# Patient Record
Sex: MALE | Race: BLACK OR AFRICAN AMERICAN | NOT HISPANIC OR LATINO | Employment: FULL TIME | URBAN - METROPOLITAN AREA
[De-identification: names, ages, dates, MRNs, and addresses within clinical notes are randomized per-mention and may not be internally consistent; named-entity substitution may affect disease eponyms.]

---

## 2020-01-22 ENCOUNTER — TELEPHONE (OUTPATIENT)
Dept: GASTROENTEROLOGY | Facility: CLINIC | Age: 60
End: 2020-01-22

## 2020-01-22 NOTE — TELEPHONE ENCOUNTER
Spoke with pt and attempted to schedule a colonoscopy. Pt stated that he would call us back to schedule procedure. Dates given. Appointment canceled for tomorrow with Dr. Baxter in [place].

## 2020-01-22 NOTE — TELEPHONE ENCOUNTER
Colonoscopy Referral    Referring Physician:   Reason for Referral: Screening  Family History of:   Colon polyp: No  Relationship/Age of Onset:   Colon cancer: No  Relationship/Age of Onset:   Patient with:   Hemoccults Done: No  Iron deficient: No  On Blood Thinner: No  Valvular heart disease/valve replacement: No  Anemia Present: No  On NSAID: No  Lung disease: No  Kidney disease: No  Hx of polyps: No  Hx of colon cancer: No  Previous colon evalations: No   Colonoscopy  When: 7 years ago   Where:     ?  Reviewed Patient's Medication's and Allergies     Last colonoscopy had a burning hand where Iv was.     Patient was scheduled for colonoscopy on  with Dr. Baxter at [facility].        instructions were reviewed with patient.